# Patient Record
Sex: MALE | Race: WHITE | Employment: OTHER | URBAN - METROPOLITAN AREA
[De-identification: names, ages, dates, MRNs, and addresses within clinical notes are randomized per-mention and may not be internally consistent; named-entity substitution may affect disease eponyms.]

---

## 2021-04-09 ENCOUNTER — OFFICE VISIT (OUTPATIENT)
Dept: OTOLARYNGOLOGY | Facility: CLINIC | Age: 81
End: 2021-04-09
Payer: MEDICARE

## 2021-04-09 VITALS
HEIGHT: 75 IN | SYSTOLIC BLOOD PRESSURE: 140 MMHG | DIASTOLIC BLOOD PRESSURE: 84 MMHG | TEMPERATURE: 97 F | WEIGHT: 230 LBS | BODY MASS INDEX: 28.6 KG/M2

## 2021-04-09 DIAGNOSIS — J32.9 CHRONIC SINUSITIS, UNSPECIFIED LOCATION: Primary | ICD-10-CM

## 2021-04-09 PROCEDURE — 99203 OFFICE O/P NEW LOW 30 MIN: CPT | Performed by: OTOLARYNGOLOGY

## 2021-04-09 RX ORDER — METOPROLOL SUCCINATE 50 MG/1
TABLET, EXTENDED RELEASE ORAL
COMMUNITY
Start: 2021-02-26

## 2021-04-09 RX ORDER — ASPIRIN 81 MG/1
81 TABLET ORAL DAILY
Status: ON HOLD | COMMUNITY
End: 2021-05-14

## 2021-04-09 NOTE — PROGRESS NOTES
Radha Wakefield is a 78year old male.   Patient presents with:  Sinus Problem: Patient Presents with: Recurring Sinus,  irritation on eyes, sinus pressure and pain occured after pre cancer treatments of Flurouracil       HISTORY OF PRESENT ILLNESS  He presen Negative Blurred vision and vision changes. Respiratory Negative Dyspnea and wheezing. Cardio Negative Chest pain, irregular heartbeat/palpitations and syncope. GI Negative Abdominal pain and diarrhea.    Endocrine Negative Cold intolerance and heat i Succinate ER 50 MG Oral Tablet 24 Hr, , Disp: , Rfl:   •  aspirin EC 81 MG Oral Tab EC, Take 81 mg by mouth daily. , Disp: , Rfl:   ASSESSMENT AND PLAN    1. Chronic sinusitis, unspecified location  Chronic sinus pain and pressure over the left side.   Inter

## 2021-04-11 ENCOUNTER — HOSPITAL ENCOUNTER (OUTPATIENT)
Dept: CT IMAGING | Facility: HOSPITAL | Age: 81
Discharge: HOME | End: 2021-04-11
Attending: OTOLARYNGOLOGY
Payer: MEDICARE

## 2021-04-11 DIAGNOSIS — J32.9 CHRONIC SINUSITIS, UNSPECIFIED LOCATION: ICD-10-CM

## 2021-04-11 PROCEDURE — 70486 CT MAXILLOFACIAL W/O DYE: CPT | Performed by: OTOLARYNGOLOGY

## 2021-04-13 ENCOUNTER — TELEPHONE (OUTPATIENT)
Dept: OTOLARYNGOLOGY | Facility: CLINIC | Age: 81
End: 2021-04-13

## 2021-04-19 ENCOUNTER — OFFICE VISIT (OUTPATIENT)
Dept: OTOLARYNGOLOGY | Facility: CLINIC | Age: 81
End: 2021-04-19
Payer: MEDICARE

## 2021-04-19 VITALS
HEIGHT: 75 IN | SYSTOLIC BLOOD PRESSURE: 173 MMHG | DIASTOLIC BLOOD PRESSURE: 79 MMHG | BODY MASS INDEX: 28.6 KG/M2 | TEMPERATURE: 97 F | WEIGHT: 230 LBS

## 2021-04-19 DIAGNOSIS — J32.9 CHRONIC SINUSITIS, UNSPECIFIED LOCATION: Primary | ICD-10-CM

## 2021-04-19 PROCEDURE — 99213 OFFICE O/P EST LOW 20 MIN: CPT | Performed by: OTOLARYNGOLOGY

## 2021-04-22 ENCOUNTER — OFFICE VISIT (OUTPATIENT)
Dept: OTOLARYNGOLOGY | Facility: CLINIC | Age: 81
End: 2021-04-22
Payer: MEDICARE

## 2021-04-22 VITALS
SYSTOLIC BLOOD PRESSURE: 152 MMHG | HEIGHT: 75 IN | BODY MASS INDEX: 28.6 KG/M2 | DIASTOLIC BLOOD PRESSURE: 70 MMHG | WEIGHT: 230 LBS | TEMPERATURE: 98 F

## 2021-04-22 DIAGNOSIS — J32.9 CHRONIC SINUSITIS, UNSPECIFIED LOCATION: Primary | ICD-10-CM

## 2021-04-22 PROCEDURE — 99214 OFFICE O/P EST MOD 30 MIN: CPT | Performed by: OTOLARYNGOLOGY

## 2021-04-22 NOTE — PROGRESS NOTES
Marcelino Johns is a 78year old male. Patient presents with:  Sinus Problem: referred by Dr Sidra Miller regarding sinus surgery       HISTORY OF PRESENT ILLNESS  4/22/2021  Patient prevents for evaluation of sinusitis.   He has had persistent left-sided facial p metoprolol for many years is very stable he is also on a baby aspirin every day and he had a stress test approximately 6 years which was normal.   GI Negative Abdominal pain and diarrhea. Endocrine Negative Cold intolerance and heat intolerance.    Neuro Tab EC, Take 81 mg by mouth daily. , Disp: , Rfl:      CT images were reviewed in detail he has fairly normal frontals and sphenoids he has complete opacification of the left maxillary sinus with suggestion of fungus he has opacification partial of the righ

## 2021-04-27 ENCOUNTER — TELEPHONE (OUTPATIENT)
Dept: OTOLARYNGOLOGY | Facility: CLINIC | Age: 81
End: 2021-04-27

## 2021-04-27 DIAGNOSIS — J32.0 CHRONIC MAXILLARY SINUSITIS: ICD-10-CM

## 2021-04-27 DIAGNOSIS — Z01.818 PRE-OP TESTING: ICD-10-CM

## 2021-04-27 DIAGNOSIS — J32.2 CHRONIC ETHMOIDAL SINUSITIS: Primary | ICD-10-CM

## 2021-04-30 ENCOUNTER — TELEPHONE (OUTPATIENT)
Dept: OTOLARYNGOLOGY | Facility: CLINIC | Age: 81
End: 2021-04-30

## 2021-04-30 NOTE — TELEPHONE ENCOUNTER
Advised pt that per Dr. Haider Armijo, pt to have BMP, EKG and chest xray completed prior to surgery on 05/14. pt advised he can go to [address] [place] [address] [facility].

## 2021-05-05 ENCOUNTER — HOSPITAL ENCOUNTER (OUTPATIENT)
Dept: GENERAL RADIOLOGY | Facility: HOSPITAL | Age: 81
Discharge: HOME | End: 2021-05-05
Attending: OTOLARYNGOLOGY
Payer: MEDICARE

## 2021-05-05 ENCOUNTER — LAB ENCOUNTER (OUTPATIENT)
Dept: LAB | Facility: HOSPITAL | Age: 81
End: 2021-05-05
Attending: OTOLARYNGOLOGY
Payer: MEDICARE

## 2021-05-05 DIAGNOSIS — Z01.818 PRE-OP TESTING: ICD-10-CM

## 2021-05-05 PROCEDURE — 71046 X-RAY EXAM CHEST 2 VIEWS: CPT | Performed by: OTOLARYNGOLOGY

## 2021-05-05 PROCEDURE — 93010 ELECTROCARDIOGRAM REPORT: CPT | Performed by: OTOLARYNGOLOGY

## 2021-05-05 PROCEDURE — 93005 ELECTROCARDIOGRAM TRACING: CPT

## 2021-05-05 PROCEDURE — 80048 BASIC METABOLIC PNL TOTAL CA: CPT

## 2021-05-05 PROCEDURE — 36415 COLL VENOUS BLD VENIPUNCTURE: CPT

## 2021-05-10 ENCOUNTER — TELEPHONE (OUTPATIENT)
Dept: OTOLARYNGOLOGY | Facility: CLINIC | Age: 81
End: 2021-05-10

## 2021-05-10 NOTE — TELEPHONE ENCOUNTER
Rn informed pt of note below and verbalized understanding,Rn faxed copy of result to pt PCP as requested #[phone].

## 2021-05-11 ENCOUNTER — LAB ENCOUNTER (OUTPATIENT)
Dept: LAB | Age: 81
End: 2021-05-11
Attending: OTOLARYNGOLOGY
Payer: MEDICARE

## 2021-05-11 DIAGNOSIS — Z01.818 PREOP TESTING: ICD-10-CM

## 2021-05-13 NOTE — H&P
Hans Cobb is a 78year old male. No chief complaint on file. HISTORY OF PRESENT ILLNESS  4/22/2021  Patient prevents for evaluation of sinusitis. He has had persistent left-sided facial pressure.   Interestingly this started after he start positive Chest pain, irregular heartbeat/palpitations and syncope.   He has been on metoprolol for many years is very stable he is also on a baby aspirin every day and he had a stress test approximately 6 years which was normal.   GI Negative Abdominal pain Disp: , Rfl:      CT images were reviewed in detail he has fairly normal frontals and sphenoids he has complete opacification of the left maxillary sinus with suggestion of fungus he has opacification partial of the right maxillary.   Bilateral ostiomeatal

## 2021-05-14 ENCOUNTER — ANESTHESIA (OUTPATIENT)
Dept: SURGERY | Facility: HOSPITAL | Age: 81
End: 2021-05-14
Payer: MEDICARE

## 2021-05-14 ENCOUNTER — ANESTHESIA EVENT (OUTPATIENT)
Dept: SURGERY | Facility: HOSPITAL | Age: 81
End: 2021-05-14
Payer: MEDICARE

## 2021-05-14 ENCOUNTER — HOSPITAL ENCOUNTER (OUTPATIENT)
Facility: HOSPITAL | Age: 81
Setting detail: HOSPITAL OUTPATIENT SURGERY
Discharge: HOME | End: 2021-05-14
Attending: OTOLARYNGOLOGY | Admitting: OTOLARYNGOLOGY
Payer: MEDICARE

## 2021-05-14 VITALS
WEIGHT: 227 LBS | SYSTOLIC BLOOD PRESSURE: 141 MMHG | BODY MASS INDEX: 28.23 KG/M2 | RESPIRATION RATE: 17 BRPM | TEMPERATURE: 98 F | HEIGHT: 75 IN | DIASTOLIC BLOOD PRESSURE: 74 MMHG | OXYGEN SATURATION: 95 % | HEART RATE: 68 BPM

## 2021-05-14 DIAGNOSIS — J32.0 CHRONIC MAXILLARY SINUSITIS: ICD-10-CM

## 2021-05-14 DIAGNOSIS — J32.2 CHRONIC ETHMOIDAL SINUSITIS: ICD-10-CM

## 2021-05-14 DIAGNOSIS — Z01.818 PREOP TESTING: Primary | ICD-10-CM

## 2021-05-14 PROCEDURE — 31255 NSL/SINS NDSC W/TOT ETHMDCT: CPT | Performed by: OTOLARYNGOLOGY

## 2021-05-14 PROCEDURE — 09HK8YZ INSERTION OF OTHER DEVICE INTO NASAL MUCOSA AND SOFT TISSUE, VIA NATURAL OR ARTIFICIAL OPENING ENDOSCOPIC: ICD-10-PCS | Performed by: OTOLARYNGOLOGY

## 2021-05-14 PROCEDURE — 09BU8ZZ EXCISION OF RIGHT ETHMOID SINUS, VIA NATURAL OR ARTIFICIAL OPENING ENDOSCOPIC: ICD-10-PCS | Performed by: OTOLARYNGOLOGY

## 2021-05-14 PROCEDURE — 099R8ZZ DRAINAGE OF LEFT MAXILLARY SINUS, VIA NATURAL OR ARTIFICIAL OPENING ENDOSCOPIC: ICD-10-PCS | Performed by: OTOLARYNGOLOGY

## 2021-05-14 PROCEDURE — 099Q8ZZ DRAINAGE OF RIGHT MAXILLARY SINUS, VIA NATURAL OR ARTIFICIAL OPENING ENDOSCOPIC: ICD-10-PCS | Performed by: OTOLARYNGOLOGY

## 2021-05-14 PROCEDURE — 31267 ENDOSCOPY MAXILLARY SINUS: CPT | Performed by: OTOLARYNGOLOGY

## 2021-05-14 PROCEDURE — 61782 SCAN PROC CRANIAL EXTRA: CPT | Performed by: OTOLARYNGOLOGY

## 2021-05-14 PROCEDURE — 09BV8ZZ EXCISION OF LEFT ETHMOID SINUS, VIA NATURAL OR ARTIFICIAL OPENING ENDOSCOPIC: ICD-10-PCS | Performed by: OTOLARYNGOLOGY

## 2021-05-14 PROCEDURE — 30930 THER FX NASAL INF TURBINATE: CPT | Performed by: OTOLARYNGOLOGY

## 2021-05-14 PROCEDURE — 8E09XBZ COMPUTER ASSISTED PROCEDURE OF HEAD AND NECK REGION: ICD-10-PCS | Performed by: OTOLARYNGOLOGY

## 2021-05-14 DEVICE — PROPEL SINUS IMPLANT
Type: IMPLANTABLE DEVICE | Site: NOSE | Status: FUNCTIONAL
Brand: PROPEL

## 2021-05-14 RX ORDER — HYDROMORPHONE HYDROCHLORIDE 1 MG/ML
0.4 INJECTION, SOLUTION INTRAMUSCULAR; INTRAVENOUS; SUBCUTANEOUS EVERY 5 MIN PRN
Status: DISCONTINUED | OUTPATIENT
Start: 2021-05-14 | End: 2021-05-14

## 2021-05-14 RX ORDER — HYDROCODONE BITARTRATE AND ACETAMINOPHEN 5; 325 MG/1; MG/1
2 TABLET ORAL AS NEEDED
Status: DISCONTINUED | OUTPATIENT
Start: 2021-05-14 | End: 2021-05-14

## 2021-05-14 RX ORDER — LIDOCAINE HYDROCHLORIDE AND EPINEPHRINE 10; 10 MG/ML; UG/ML
INJECTION, SOLUTION INFILTRATION; PERINEURAL AS NEEDED
Status: DISCONTINUED | OUTPATIENT
Start: 2021-05-14 | End: 2021-05-14 | Stop reason: HOSPADM

## 2021-05-14 RX ORDER — HALOPERIDOL 5 MG/ML
0.25 INJECTION INTRAMUSCULAR ONCE AS NEEDED
Status: DISCONTINUED | OUTPATIENT
Start: 2021-05-14 | End: 2021-05-14

## 2021-05-14 RX ORDER — ACETAMINOPHEN 500 MG
1000 TABLET ORAL ONCE
Status: COMPLETED | OUTPATIENT
Start: 2021-05-14 | End: 2021-05-14

## 2021-05-14 RX ORDER — DIAPER,BRIEF,INFANT-TODD,DISP
EACH MISCELLANEOUS AS NEEDED
Status: DISCONTINUED | OUTPATIENT
Start: 2021-05-14 | End: 2021-05-14 | Stop reason: HOSPADM

## 2021-05-14 RX ORDER — METOPROLOL TARTRATE 5 MG/5ML
2.5 INJECTION INTRAVENOUS ONCE
Status: DISCONTINUED | OUTPATIENT
Start: 2021-05-14 | End: 2021-05-14

## 2021-05-14 RX ORDER — HYDROMORPHONE HYDROCHLORIDE 1 MG/ML
0.6 INJECTION, SOLUTION INTRAMUSCULAR; INTRAVENOUS; SUBCUTANEOUS EVERY 5 MIN PRN
Status: DISCONTINUED | OUTPATIENT
Start: 2021-05-14 | End: 2021-05-14

## 2021-05-14 RX ORDER — DEXAMETHASONE 6 MG/1
TABLET ORAL
Qty: 4 TABLET | Refills: 0 | Status: SHIPPED | OUTPATIENT
Start: 2021-05-15

## 2021-05-14 RX ORDER — NALOXONE HYDROCHLORIDE 0.4 MG/ML
80 INJECTION, SOLUTION INTRAMUSCULAR; INTRAVENOUS; SUBCUTANEOUS AS NEEDED
Status: DISCONTINUED | OUTPATIENT
Start: 2021-05-14 | End: 2021-05-14

## 2021-05-14 RX ORDER — MORPHINE SULFATE 4 MG/ML
2 INJECTION, SOLUTION INTRAMUSCULAR; INTRAVENOUS EVERY 10 MIN PRN
Status: DISCONTINUED | OUTPATIENT
Start: 2021-05-14 | End: 2021-05-14

## 2021-05-14 RX ORDER — SODIUM CHLORIDE, SODIUM LACTATE, POTASSIUM CHLORIDE, CALCIUM CHLORIDE 600; 310; 30; 20 MG/100ML; MG/100ML; MG/100ML; MG/100ML
INJECTION, SOLUTION INTRAVENOUS CONTINUOUS
Status: DISCONTINUED | OUTPATIENT
Start: 2021-05-14 | End: 2021-05-14

## 2021-05-14 RX ORDER — ONDANSETRON 2 MG/ML
4 INJECTION INTRAMUSCULAR; INTRAVENOUS ONCE AS NEEDED
Status: DISCONTINUED | OUTPATIENT
Start: 2021-05-14 | End: 2021-05-14

## 2021-05-14 RX ORDER — PROCHLORPERAZINE EDISYLATE 5 MG/ML
5 INJECTION INTRAMUSCULAR; INTRAVENOUS ONCE AS NEEDED
Status: DISCONTINUED | OUTPATIENT
Start: 2021-05-14 | End: 2021-05-14

## 2021-05-14 RX ORDER — HYDROMORPHONE HYDROCHLORIDE 1 MG/ML
0.2 INJECTION, SOLUTION INTRAMUSCULAR; INTRAVENOUS; SUBCUTANEOUS EVERY 5 MIN PRN
Status: DISCONTINUED | OUTPATIENT
Start: 2021-05-14 | End: 2021-05-14

## 2021-05-14 RX ORDER — AMOXICILLIN 500 MG/1
500 CAPSULE ORAL 3 TIMES DAILY
Qty: 30 CAPSULE | Refills: 0 | Status: SHIPPED | OUTPATIENT
Start: 2021-05-14 | End: 2021-05-24

## 2021-05-14 RX ORDER — MORPHINE SULFATE 4 MG/ML
4 INJECTION, SOLUTION INTRAMUSCULAR; INTRAVENOUS EVERY 10 MIN PRN
Status: DISCONTINUED | OUTPATIENT
Start: 2021-05-14 | End: 2021-05-14

## 2021-05-14 RX ORDER — CEFAZOLIN SODIUM/WATER 2 G/20 ML
SYRINGE (ML) INTRAVENOUS AS NEEDED
Status: DISCONTINUED | OUTPATIENT
Start: 2021-05-14 | End: 2021-05-14 | Stop reason: SURG

## 2021-05-14 RX ORDER — HYDROCODONE BITARTRATE AND ACETAMINOPHEN 5; 325 MG/1; MG/1
1-2 TABLET ORAL EVERY 4 HOURS PRN
Qty: 20 TABLET | Refills: 0 | Status: SHIPPED | OUTPATIENT
Start: 2021-05-14

## 2021-05-14 RX ORDER — DEXAMETHASONE SODIUM PHOSPHATE 4 MG/ML
VIAL (ML) INJECTION AS NEEDED
Status: DISCONTINUED | OUTPATIENT
Start: 2021-05-14 | End: 2021-05-14 | Stop reason: SURG

## 2021-05-14 RX ORDER — ONDANSETRON 2 MG/ML
INJECTION INTRAMUSCULAR; INTRAVENOUS AS NEEDED
Status: DISCONTINUED | OUTPATIENT
Start: 2021-05-14 | End: 2021-05-14 | Stop reason: SURG

## 2021-05-14 RX ORDER — MORPHINE SULFATE 10 MG/ML
6 INJECTION, SOLUTION INTRAMUSCULAR; INTRAVENOUS EVERY 10 MIN PRN
Status: DISCONTINUED | OUTPATIENT
Start: 2021-05-14 | End: 2021-05-14

## 2021-05-14 RX ORDER — HYDROCODONE BITARTRATE AND ACETAMINOPHEN 5; 325 MG/1; MG/1
1 TABLET ORAL AS NEEDED
Status: DISCONTINUED | OUTPATIENT
Start: 2021-05-14 | End: 2021-05-14

## 2021-05-14 RX ADMIN — DEXAMETHASONE SODIUM PHOSPHATE 10 MG: 4 MG/ML VIAL (ML) INJECTION at 13:51:00

## 2021-05-14 RX ADMIN — ONDANSETRON 4 MG: 2 INJECTION INTRAMUSCULAR; INTRAVENOUS at 13:58:00

## 2021-05-14 RX ADMIN — SODIUM CHLORIDE, SODIUM LACTATE, POTASSIUM CHLORIDE, CALCIUM CHLORIDE: 600; 310; 30; 20 INJECTION, SOLUTION INTRAVENOUS at 14:25:00

## 2021-05-14 RX ADMIN — CEFAZOLIN SODIUM/WATER 2 G: 2 G/20 ML SYRINGE (ML) INTRAVENOUS at 13:54:00

## 2021-05-14 NOTE — OPERATIVE REPORT
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   OPERATIVE PROCEDURE:   [employer] assisted navigational sinus surgeryTamiko stay lateral to the middle turbinate. As the diseased ethmoid air cells were removed as the location of the lamina papyracea was appreciated and this was noted to be intact.   The basal lamella was traversed and posterior ethmoid air cells removed back to

## 2021-05-14 NOTE — INTERVAL H&P NOTE
Pre-op Diagnosis: Chronic ethmoidal sinusitis [J32.2]  Chronic maxillary sinusitis [J32.0]    The above referenced H&P was reviewed by Bita Bethea MD on 5/14/2021, the patient was examined and no significant changes have occurred in the patient's conditi

## 2021-05-14 NOTE — ANESTHESIA POSTPROCEDURE EVALUATION
Patient: David Fitzpatrick    Procedure Summary     Date: 05/14/21 Room / Location: [facility] OR 03 / [facility] OR    Anesthesia Start: 1446 Anesthesia Stop: 6937    Procedure: Endoscopic maxillary antrostomy with tissue removal , Endoscopic total ethmoid

## 2021-05-14 NOTE — ANESTHESIA PROCEDURE NOTES
Airway  Date/Time: 5/14/2021 1:40 PM  Urgency: Elective      General Information and Staff    Patient location during procedure: OR  Anesthesiologist: Monika Fuentes MD  Performed: anesthesiologist     Indications and Patient Condition  Indicatio

## 2021-05-14 NOTE — ANESTHESIA PREPROCEDURE EVALUATION
Anesthesia PreOp Note    HPI:     Margoth Valera is a 78year old male who presents for preoperative consultation requested by: Jean Claude Urbina MD    Date of Surgery: 5/14/2021    Procedure(s):  Endoscopic maxillary antrostomy with tissue removal , En History    Socioeconomic History      Marital status:       Spouse name: Not on file      Number of children: Not on file      Years of education: Not on file      Highest education level: Not on file    Occupational History      Not on file    [place] (6' 3\") and weight is 103 kg (227 lb). His oral temperature is 98.2 °F (36.8 °C). His blood pressure is 171/88 (abnormal) and his pulse is 66. His respiration is 18 and oxygen saturation is 99%.     05/11/21  1340 05/14/21  1220   BP:  (!) 171/88   Pulse:

## 2021-05-15 ENCOUNTER — TELEPHONE (OUTPATIENT)
Dept: OTOLARYNGOLOGY | Facility: CLINIC | Age: 81
End: 2021-05-15

## 2021-05-15 NOTE — TELEPHONE ENCOUNTER
Pt is post op day 1 endo maxillary with tissue removal, endo total ethmoidectomy,  Per  Pt pt is doing well no bleeding, advised pt no bending or heavy lifting for the next week and pt is not to blow nose until seen by JK.  Advised pt she can start using OT

## 2021-05-21 ENCOUNTER — OFFICE VISIT (OUTPATIENT)
Dept: OTOLARYNGOLOGY | Facility: CLINIC | Age: 81
End: 2021-05-21
Payer: MEDICARE

## 2021-05-21 VITALS
WEIGHT: 230 LBS | BODY MASS INDEX: 28.6 KG/M2 | DIASTOLIC BLOOD PRESSURE: 75 MMHG | TEMPERATURE: 98 F | SYSTOLIC BLOOD PRESSURE: 141 MMHG | HEIGHT: 75 IN

## 2021-05-21 DIAGNOSIS — J32.0 CHRONIC MAXILLARY SINUSITIS: ICD-10-CM

## 2021-05-21 DIAGNOSIS — J32.2 CHRONIC ETHMOIDAL SINUSITIS: Primary | ICD-10-CM

## 2021-05-21 PROCEDURE — 31237 NSL/SINS NDSC SURG BX POLYPC: CPT | Performed by: OTOLARYNGOLOGY

## 2021-05-21 PROCEDURE — 99024 POSTOP FOLLOW-UP VISIT: CPT | Performed by: OTOLARYNGOLOGY

## 2021-05-21 RX ORDER — SILDENAFIL 50 MG/1
TABLET, FILM COATED ORAL
COMMUNITY
Start: 2021-05-04

## 2021-05-21 NOTE — PROGRESS NOTES
Kathie Cook is a 78year old male. Patient presents with:  Post-Op: pt presents today due to post op endo maxillary and endo total ethmoidectomy done on 05/14.       HISTORY OF PRESENT ILLNESS  4/22/2021  Patient prevents for evaluation of sinusit • NASAL SCOPY,RMV TISS MAXILL SINUS  05/14/2021   • OTHER SURGICAL HISTORY      Partial Thyroid    • SKIN SURGERY      lesion on nose benign    • TOTAL KNEE REPLACEMENT Bilateral 2012         REVIEW OF SYSTEMS    System Neg/Pos Details   Constitutional N - Right: Normal, Left: Normal.     Skin Normal Inspection - Normal.        Lymph Detail Normal Submental. Submandibular. Anterior cervical. Posterior cervical. Supraclavicular.               Nose/Mouth/Throat abNormal Nares - Right: Normal Left: Normal. [employer] voice recognition dictation software. As a result, errors may occur. When identified, these errors have been corrected.  While every attempt is made to correct errors during dictation, discrepancies may still exist

## 2021-05-28 ENCOUNTER — OFFICE VISIT (OUTPATIENT)
Dept: OTOLARYNGOLOGY | Facility: CLINIC | Age: 81
End: 2021-05-28
Payer: MEDICARE

## 2021-05-28 VITALS
TEMPERATURE: 99 F | DIASTOLIC BLOOD PRESSURE: 68 MMHG | HEIGHT: 75 IN | WEIGHT: 230 LBS | BODY MASS INDEX: 28.6 KG/M2 | SYSTOLIC BLOOD PRESSURE: 124 MMHG

## 2021-05-28 DIAGNOSIS — J32.2 CHRONIC ETHMOIDAL SINUSITIS: Primary | ICD-10-CM

## 2021-05-28 DIAGNOSIS — J32.0 CHRONIC MAXILLARY SINUSITIS: ICD-10-CM

## 2021-05-28 PROCEDURE — 99024 POSTOP FOLLOW-UP VISIT: CPT | Performed by: OTOLARYNGOLOGY

## 2021-05-28 PROCEDURE — 31237 NSL/SINS NDSC SURG BX POLYPC: CPT | Performed by: OTOLARYNGOLOGY

## 2021-05-28 NOTE — PROGRESS NOTES
Lucia Anderson is a 78year old male. Patient presents with:  Post-Op: s/p sinus surgery done on 5/14/21      HISTORY OF PRESENT ILLNESS  4/22/2021  Patient prevents for evaluation of sinusitis. He has had persistent left-sided facial pressure.   In Past Surgical History:   Procedure Laterality Date   • CATARACT Bilateral    • NASAL SCOPY,REMV PART ETHMOID  05/14/2021   • NASAL SCOPY,RMV TISS MAXILL SINUS  05/14/2021   • OTHER SURGICAL HISTORY      Partial Thyroid    • SKIN SURGERY      lesion o Normal. Skull - Normal.             Ears Normal Inspection - Right: Normal, Left: Normal.     Skin Normal Inspection - Normal.        Lymph Detail Normal Submental. Submandibular. Anterior cervical. Posterior cervical. Supraclavicular.               Nose/Mo as-needed basis           Siobhan Ritter MD  This note was partially prepared using [employer] voice recognition dictation software. As a result, errors may occur. When identified, these errors have been corrected.  While every attempt is made to correct

## (undated) DEVICE — HEAD & NECK: Brand: MEDLINE INDUSTRIES, INC.

## (undated) DEVICE — GAMMEX® NON-LATEX PI ORTHO SIZE 6.5, STERILE POLYISOPRENE POWDER-FREE SURGICAL GLOVE: Brand: GAMMEX

## (undated) DEVICE — BLADE 1884380EM QUADCUT 4.3MMX13CM ROHS: Brand: ROTATABLE FUSION®

## (undated) DEVICE — SHEATH 1912040 5PK ES2 STRYKER 4MM/0DEG: Brand: ENDO-SCRUB®

## (undated) DEVICE — SOL  .9 1000ML BTL

## (undated) DEVICE — PATIENT TRACKER 9734887XOM NON-INVASIVE

## (undated) DEVICE — SYRINGE 10ML LL CONTRL SYRINGE

## (undated) DEVICE — MEDI-VAC NON-CONDUCTIVE SUCTION TUBING: Brand: CARDINAL HEALTH

## (undated) DEVICE — WIPE WITH WICK AND CORNEAL SHIELD: Brand: MEROCEL

## (undated) DEVICE — INSTRUMENT TRACKER 9733533XOM ENT 1PK

## (undated) DEVICE — SUTURE SILK 2-0 623H

## (undated) DEVICE — ELECTROSURGICAL SUCTION COAGULATOR, 10FR

## (undated) DEVICE — EYE PADSSTERILENOT MADE WITH NATURAL RUBBER LATEXSINGLE USE ONLYDO NOT USE IF PACKAGE OPENED OR DAMAGED: Brand: CARDINAL HEALTH

## (undated) DEVICE — SHEATH 1912041 5PK ES2 STRYKER 4MM/30DEG: Brand: ENDO-SCRUB®

## (undated) DEVICE — NASAL ACCESSORY: Brand: MEDLINE INDUSTRIES, INC.

## (undated) DEVICE — SUCTION CANISTER, 3000CC,SAFELINER: Brand: DEROYAL

## (undated) NOTE — LETTER
[facility] [address] [address]       04/09/21        Patient: Aan Lora   YOB: 1940   Date of Visit: 4/9/2021       Dear  Dr. Tamiko Galo MD,      Thank you for referring Ana Lora to my practice.   Please

## (undated) NOTE — LETTER
Shawn Fry, [place]  [address],  [address] [place] [address]       04/22/21        Patient: Tracy Vital   YOB: 1940   Date of Visit: 4/22/2021       Dear  Dr. Herbie Bose MD,      Thank you for referring Tracy Vital to my practice.